# Patient Record
Sex: FEMALE | Race: ASIAN | NOT HISPANIC OR LATINO | URBAN - METROPOLITAN AREA
[De-identification: names, ages, dates, MRNs, and addresses within clinical notes are randomized per-mention and may not be internally consistent; named-entity substitution may affect disease eponyms.]

---

## 2024-08-05 ENCOUNTER — INPATIENT (INPATIENT)
Facility: HOSPITAL | Age: 39
LOS: 0 days | Discharge: HOME | End: 2024-08-06
Attending: OBSTETRICS & GYNECOLOGY | Admitting: OBSTETRICS & GYNECOLOGY
Payer: COMMERCIAL

## 2024-08-05 VITALS — HEIGHT: 64 IN | TEMPERATURE: 98 F | WEIGHT: 200.62 LBS

## 2024-08-05 LAB
BASOPHILS # BLD AUTO: 0.03 K/UL — SIGNIFICANT CHANGE UP (ref 0–0.2)
BASOPHILS NFR BLD AUTO: 0.3 % — SIGNIFICANT CHANGE UP (ref 0–2)
BLD GP AB SCN SERPL QL: NEGATIVE — SIGNIFICANT CHANGE UP
EOSINOPHIL # BLD AUTO: 0.27 K/UL — SIGNIFICANT CHANGE UP (ref 0–0.5)
EOSINOPHIL NFR BLD AUTO: 2.7 % — SIGNIFICANT CHANGE UP (ref 0–6)
HCT VFR BLD CALC: 38 % — SIGNIFICANT CHANGE UP (ref 34.5–45)
HGB BLD-MCNC: 12.3 G/DL — SIGNIFICANT CHANGE UP (ref 11.5–15.5)
IMM GRANULOCYTES NFR BLD AUTO: 1.1 % — HIGH (ref 0–0.9)
LYMPHOCYTES # BLD AUTO: 1.72 K/UL — SIGNIFICANT CHANGE UP (ref 1–3.3)
LYMPHOCYTES # BLD AUTO: 17.1 % — SIGNIFICANT CHANGE UP (ref 13–44)
MCHC RBC-ENTMCNC: 30 PG — SIGNIFICANT CHANGE UP (ref 27–34)
MCHC RBC-ENTMCNC: 32.4 GM/DL — SIGNIFICANT CHANGE UP (ref 32–36)
MCV RBC AUTO: 92.7 FL — SIGNIFICANT CHANGE UP (ref 80–100)
MONOCYTES # BLD AUTO: 0.72 K/UL — SIGNIFICANT CHANGE UP (ref 0–0.9)
MONOCYTES NFR BLD AUTO: 7.1 % — SIGNIFICANT CHANGE UP (ref 2–14)
NEUTROPHILS # BLD AUTO: 7.22 K/UL — SIGNIFICANT CHANGE UP (ref 1.8–7.4)
NEUTROPHILS NFR BLD AUTO: 71.7 % — SIGNIFICANT CHANGE UP (ref 43–77)
NRBC # BLD: 0 /100 WBCS — SIGNIFICANT CHANGE UP (ref 0–0)
PLATELET # BLD AUTO: 223 K/UL — SIGNIFICANT CHANGE UP (ref 150–400)
RBC # BLD: 4.1 M/UL — SIGNIFICANT CHANGE UP (ref 3.8–5.2)
RBC # FLD: 14.2 % — SIGNIFICANT CHANGE UP (ref 10.3–14.5)
RH IG SCN BLD-IMP: POSITIVE — SIGNIFICANT CHANGE UP
RH IG SCN BLD-IMP: POSITIVE — SIGNIFICANT CHANGE UP
WBC # BLD: 10.07 K/UL — SIGNIFICANT CHANGE UP (ref 3.8–10.5)
WBC # FLD AUTO: 10.07 K/UL — SIGNIFICANT CHANGE UP (ref 3.8–10.5)

## 2024-08-05 RX ORDER — IBUPROFEN 200 MG
600 TABLET ORAL EVERY 6 HOURS
Refills: 0 | Status: COMPLETED | OUTPATIENT
Start: 2024-08-05 | End: 2025-07-04

## 2024-08-05 RX ORDER — DIBUCAINE 1 %
1 OINTMENT (GRAM) TOPICAL EVERY 6 HOURS
Refills: 0 | Status: DISCONTINUED | OUTPATIENT
Start: 2024-08-05 | End: 2024-08-06

## 2024-08-05 RX ORDER — BACTERIOSTATIC SODIUM CHLORIDE 0.9 %
3 VIAL (ML) INJECTION EVERY 8 HOURS
Refills: 0 | Status: DISCONTINUED | OUTPATIENT
Start: 2024-08-05 | End: 2024-08-06

## 2024-08-05 RX ORDER — MAGNESIUM HYDROXIDE 400 MG/5ML
30 SUSPENSION, ORAL (FINAL DOSE FORM) ORAL
Refills: 0 | Status: DISCONTINUED | OUTPATIENT
Start: 2024-08-05 | End: 2024-08-06

## 2024-08-05 RX ORDER — CLOSTRIDIUM TETANI TOXOID ANTIGEN (FORMALDEHYDE INACTIVATED), CORYNEBACTERIUM DIPHTHERIAE TOXOID ANTIGEN (FORMALDEHYDE INACTIVATED), BORDETELLA PERTUSSIS TOXOID ANTIGEN (GLUTARALDEHYDE INACTIVATED), BORDETELLA PERTUSSIS FILAMENTOUS HEMAGGLUTININ ANTIGEN (FORMALDEHYDE INACTIVATED), BORDETELLA PERTUSSIS PERTACTIN ANTIGEN, AND BORDETELLA PERTUSSIS FIMBRIAE 2/3 ANTIGEN 5; 2; 2.5; 5; 3; 5 [LF]/.5ML; [LF]/.5ML; UG/.5ML; UG/.5ML; UG/.5ML; UG/.5ML
0.5 INJECTION, SUSPENSION INTRAMUSCULAR ONCE
Refills: 0 | Status: DISCONTINUED | OUTPATIENT
Start: 2024-08-05 | End: 2024-08-06

## 2024-08-05 RX ORDER — OXYCODONE HYDROCHLORIDE 30 MG/1
5 TABLET ORAL
Refills: 0 | Status: DISCONTINUED | OUTPATIENT
Start: 2024-08-05 | End: 2024-08-06

## 2024-08-05 RX ORDER — HYDROCORTISONE 1 %
1 CREAM (GRAM) TOPICAL EVERY 6 HOURS
Refills: 0 | Status: DISCONTINUED | OUTPATIENT
Start: 2024-08-05 | End: 2024-08-06

## 2024-08-05 RX ORDER — CRANBERRY FRUIT EXTRACT 650 MG
1 CAPSULE ORAL DAILY
Refills: 0 | Status: DISCONTINUED | OUTPATIENT
Start: 2024-08-05 | End: 2024-08-06

## 2024-08-05 RX ORDER — ACETAMINOPHEN 500 MG
975 TABLET ORAL
Refills: 0 | Status: DISCONTINUED | OUTPATIENT
Start: 2024-08-05 | End: 2024-08-06

## 2024-08-05 RX ORDER — LANOLIN 100 %
1 OINTMENT (GRAM) TOPICAL EVERY 6 HOURS
Refills: 0 | Status: DISCONTINUED | OUTPATIENT
Start: 2024-08-05 | End: 2024-08-06

## 2024-08-05 RX ORDER — SIMETHICONE 125 MG/1
80 TABLET, CHEWABLE ORAL EVERY 4 HOURS
Refills: 0 | Status: DISCONTINUED | OUTPATIENT
Start: 2024-08-05 | End: 2024-08-06

## 2024-08-05 RX ORDER — OXYTOCIN/RINGER'S LACTATE 20/1000 ML
333.33 PLASTIC BAG, INJECTION (ML) INTRAVENOUS
Qty: 20 | Refills: 0 | Status: DISCONTINUED | OUTPATIENT
Start: 2024-08-05 | End: 2024-08-05

## 2024-08-05 RX ORDER — DIPHENHYDRAMINE HCL 25 MG
25 CAPSULE ORAL EVERY 6 HOURS
Refills: 0 | Status: DISCONTINUED | OUTPATIENT
Start: 2024-08-05 | End: 2024-08-06

## 2024-08-05 RX ORDER — OXYTOCIN/RINGER'S LACTATE 20/1000 ML
41.67 PLASTIC BAG, INJECTION (ML) INTRAVENOUS
Qty: 20 | Refills: 0 | Status: DISCONTINUED | OUTPATIENT
Start: 2024-08-05 | End: 2024-08-06

## 2024-08-05 RX ORDER — KETOROLAC TROMETHAMINE 10 MG
30 TABLET ORAL ONCE
Refills: 0 | Status: DISCONTINUED | OUTPATIENT
Start: 2024-08-05 | End: 2024-08-05

## 2024-08-05 RX ORDER — DEXTROSE MONOHYDRATE, SODIUM CHLORIDE, SODIUM LACTATE, CALCIUM CHLORIDE, MAGNESIUM CHLORIDE 1.5; 538; 448; 18.4; 5.08 G/100ML; MG/100ML; MG/100ML; MG/100ML; MG/100ML
1000 SOLUTION INTRAPERITONEAL
Refills: 0 | Status: DISCONTINUED | OUTPATIENT
Start: 2024-08-05 | End: 2024-08-05

## 2024-08-05 RX ORDER — AMPICILLIN 1 G/1
1 INJECTION, POWDER, FOR SOLUTION INTRAMUSCULAR; INTRAVENOUS EVERY 4 HOURS
Refills: 0 | Status: DISCONTINUED | OUTPATIENT
Start: 2024-08-05 | End: 2024-08-05

## 2024-08-05 RX ORDER — WITCH HAZEL 500 MG/1
1 CLOTH TOPICAL EVERY 4 HOURS
Refills: 0 | Status: DISCONTINUED | OUTPATIENT
Start: 2024-08-05 | End: 2024-08-06

## 2024-08-05 RX ORDER — OXYTOCIN/RINGER'S LACTATE 20/1000 ML
6 PLASTIC BAG, INJECTION (ML) INTRAVENOUS
Qty: 30 | Refills: 0 | Status: DISCONTINUED | OUTPATIENT
Start: 2024-08-05 | End: 2024-08-05

## 2024-08-05 RX ORDER — TRISODIUM CITRATE DIHYDRATE AND CITRIC ACID MONOHYDRATE 500; 334 MG/5ML; MG/5ML
15 SOLUTION ORAL EVERY 6 HOURS
Refills: 0 | Status: DISCONTINUED | OUTPATIENT
Start: 2024-08-05 | End: 2024-08-05

## 2024-08-05 RX ORDER — IBUPROFEN 200 MG
600 TABLET ORAL EVERY 6 HOURS
Refills: 0 | Status: DISCONTINUED | OUTPATIENT
Start: 2024-08-05 | End: 2024-08-06

## 2024-08-05 RX ORDER — AMPICILLIN 1 G/1
2 INJECTION, POWDER, FOR SOLUTION INTRAMUSCULAR; INTRAVENOUS ONCE
Refills: 0 | Status: COMPLETED | OUTPATIENT
Start: 2024-08-05 | End: 2024-08-05

## 2024-08-05 RX ORDER — CHLORHEXIDINE GLUCONATE 500 MG/1
1 CLOTH TOPICAL DAILY
Refills: 0 | Status: DISCONTINUED | OUTPATIENT
Start: 2024-08-05 | End: 2024-08-05

## 2024-08-05 RX ADMIN — DEXTROSE MONOHYDRATE, SODIUM CHLORIDE, SODIUM LACTATE, CALCIUM CHLORIDE, MAGNESIUM CHLORIDE 125 MILLILITER(S): 1.5; 538; 448; 18.4; 5.08 SOLUTION INTRAPERITONEAL at 11:00

## 2024-08-05 RX ADMIN — Medication 30 MILLIGRAM(S): at 15:25

## 2024-08-05 RX ADMIN — Medication 975 MILLIGRAM(S): at 18:10

## 2024-08-05 RX ADMIN — AMPICILLIN 108 GRAM(S): 1 INJECTION, POWDER, FOR SOLUTION INTRAMUSCULAR; INTRAVENOUS at 13:17

## 2024-08-05 RX ADMIN — DEXTROSE MONOHYDRATE, SODIUM CHLORIDE, SODIUM LACTATE, CALCIUM CHLORIDE, MAGNESIUM CHLORIDE 125 MILLILITER(S): 1.5; 538; 448; 18.4; 5.08 SOLUTION INTRAPERITONEAL at 09:20

## 2024-08-05 RX ADMIN — AMPICILLIN 216 GRAM(S): 1 INJECTION, POWDER, FOR SOLUTION INTRAMUSCULAR; INTRAVENOUS at 09:26

## 2024-08-05 RX ADMIN — Medication 975 MILLIGRAM(S): at 23:20

## 2024-08-05 RX ADMIN — Medication 6 MILLIUNIT(S)/MIN: at 10:17

## 2024-08-05 RX ADMIN — Medication 600 MILLIGRAM(S): at 20:40

## 2024-08-05 RX ADMIN — Medication 975 MILLIGRAM(S): at 18:46

## 2024-08-05 RX ADMIN — Medication 1 SPRAY(S): at 18:10

## 2024-08-05 RX ADMIN — Medication 125 MILLIUNIT(S)/MIN: at 14:05

## 2024-08-05 NOTE — OB PROVIDER LABOR PROGRESS NOTE - ASSESSMENT
- Cat II FHT 2/2 nonrecurrent variable decels  - Epidural  - S/p balloon  - Pit at 6mu, cont as tolerated  - Dr. Canada in house  - Anticipate vaginal delivery

## 2024-08-05 NOTE — OB PROVIDER H&P - HISTORY OF PRESENT ILLNESS
40 y/o  at 39w4d presents for induction of labor due to maternal age and weight gain. Reports contractions beginning today, irregular. Denies leakage of fluid and vaginal bleeding. Endorses fetal movement.    ANTE: Spontaneous pregnancy. NIPT and anatomy scan WNL. GTT passed. GBS positive. Denies HTN or thyroid disorders. EFW ~3000 g last week.    OB: G1 -  2018, ~3000g, no complications.   GynHx: Denies fibroids, ovarian cysts, STI's, or abnormal PAP.  PMHx: Asthma in youth, never hospitalized or intubated. Denies inhaler use.   PSurgHx: Denies  Medications: PNV  ALL: NKDA    BP:   HR:  Gen: NAD, A&Ox3  Abd: non-tender, gravid  LE: no swelling or pitting edema  Pulm: no increased WOB  SVE:     FHT:  bpm, moderate variability, + accels, - decels.  [place]: Uterine irritability  TAUS: Cephalic.    - IV fluids, NPO  - Admit to L&D  - Continuous FHT and toco monitoring. Currently reactive and reassuring.  - Prenatals reviewed. GBS positive. Ampicillin ordered for ppx.  - Cook balloon placed 0945, start pitocin  - Risks, benefits, and alternatives discussed. Consents obtained.    D/W  ALEX WaltersS 38 y/o  at 39w4d presents for induction of labor due to maternal age and weight gain. Reports contractions beginning today, irregular. Denies leakage of fluid and vaginal bleeding. Endorses fetal movement.    ANTE: Spontaneous pregnancy. NIPT and anatomy scan WNL. GTT passed. GBS positive. Denies HTN or thyroid disorders. EFW ~3000 g last week.    OB: G1 -  2018, ~3000g, no complications.   GynHx: Denies fibroids, ovarian cysts, STI's, or abnormal PAP.  PMHx: Asthma in youth, never hospitalized or intubated. Denies inhaler use.   PSurgHx: Denies  Medications: PNV  ALL: NKDA    BP:   HR:  Gen: NAD, A&Ox3  Abd: non-tender, gravid  LE: no swelling or pitting edema  Pulm: no increased WOB  SVE:     FHT:  bpm, moderate variability, + accels, - decels.  [place]: Uterine irritability  TAUS: Cephalic.    38 y/o  at 39w4d presents for induction of labor  - IV fluids, NPO  - Admit to L&D  - Continuous FHT and toco monitoring. Currently reactive and reassuring.  - Prenatals reviewed. GBS positive. Ampicillin ordered for ppx.  - Cook balloon placed 0945, start pitocin  - Risks, benefits, and alternatives discussed. Consents obtained.    D/W  ALEX WaltersS 40 y/o  at 39w4d presents for term induction of labor. Reports contractions beginning today, irregular. Denies leakage of fluid and vaginal bleeding. Endorses fetal movement.    ANTE: Spontaneous pregnancy. NIPT and anatomy scan WNL. GTT passed. GBS positive. Denies HTN or thyroid disorders. EFW 3000 g last week.    OB: G1 -  2018, 3000g, no complications.   G2 - current  GynHx: Denies fibroids, ovarian cysts, STI's, or abnormal PAP.  PMHx: Asthma in youth, never hospitalized or intubated. Denies current inhaler use.   PSurgHx: Denies  Medications: PNV  ALL: NKDA    BP:   HR:  Gen: NAD, A&Ox3  Abd: non-tender, gravid  LE: no swelling or pitting edema  Pulm: no increased WOB  SVE:     FHT:  bpm, moderate variability, + accels, - decels.  [place]: Uterine irritability  TAUS: Cephalic.   40 y/o  at 39w4d presents for term induction of labor. Reports contractions beginning today, irregular. Denies leakage of fluid and vaginal bleeding. Endorses fetal movement.    ANTE: Spontaneous pregnancy. NIPT and anatomy scan WNL. GTT passed. GBS positive. Denies HTN or thyroid disorders. EFW 3000 g last week.    OB: G1 -  2018, 3000g, no complications.   G2 - current  GynHx: Denies fibroids, ovarian cysts, STI's, or abnormal PAP.  PMHx: Asthma in youth, never hospitalized or intubated. Denies current inhaler use.   PSurgHx: Denies  Medications: PNV  ALL: NKDA      Gen: NAD, A&Ox3  Abd: non-tender, gravid  LE: no swelling or pitting edema  Pulm: no increased WOB  SVE: deferred for baloon placement    FHT:  bpm, moderate variability, + accels, - decels.  [place]: Uterine irritability  TAUS: Cephalic.

## 2024-08-05 NOTE — OB RN DELIVERY SUMMARY - NSSELHIDDEN_OBGYN_ALL_OB_FT
[NS_DeliveryAttending1_OBGYN_ALL_OB_FT:[id number]],[NS_DeliveryAssist1_OBGYN_ALL_OB_FT:[id number]=],[NS_DeliveryRN_OBGYN_ALL_OB_FT:[id number]=]

## 2024-08-05 NOTE — PRE-ANESTHESIA EVALUATION ADULT - NSANTHPMHFT_GEN_ALL_CORE
OB: G1 -  2018, 3000g, no complications.   G2 - current  GynHx: Denies fibroids, ovarian cysts, STI's, or abnormal PAP.  PMHx: Asthma in youth, never hospitalized or intubated. Denies current inhaler use.   PSurgHx: Denies  Medications: PNV  ALL: NKDA

## 2024-08-05 NOTE — OB PROVIDER LABOR PROGRESS NOTE - NS_SUBJECTIVE/OBJECTIVE_OBGYN_ALL_OB_FT
Pt seen at bedside. Comfortable w/ epidural. SVE 5/50/-3, AROM light mec. FHT reviewed. Baseline 140, mod variability, +accels, -decels. Ctx q2-3min

## 2024-08-05 NOTE — OB PROVIDER H&P - ASSESSMENT
38 y/o  at 39w4d presents for term induction of labor  - IV fluids, NPO. Labs sent   - Admit to L&D  - Continuous FHT and toco monitoring. Currently reactive and reassuring.  - Prenatals reviewed. GBS positive. Ampicillin ordered for ppx.  - Plan for balloon and pitocin per Dr. Canada  - Risks, benefits, and alternatives discussed. Consents obtained.    D/W Miguel PGY1 [place] PGY4 [place] attending  Rayna Villalobos, PA-S

## 2024-08-05 NOTE — OB PROVIDER H&P - NSLOWPPHRISK_OBGYN_A_OB
No previous uterine incision/Olea Pregnancy/Less than or equal to 4 previous vaginal births/No known bleeding disorder/No history of postpartum hemorrhage

## 2024-08-05 NOTE — OB PROVIDER LABOR PROGRESS NOTE - ASSESSMENT
- Cat I FHT  - S/p balloon  - Epidural  - Light mec, NICU to be present at delivery  - Pit at 6mu, cont as tolerated  - Dr. Canada updated  - Cont current managemetn

## 2024-08-05 NOTE — OB RN DELIVERY SUMMARY - NS_SEPSISRSKCALC_OBGYN_ALL_OB_FT
EOS calculated successfully. EOS Risk Factor: 0.5/1000 live births ([facility] national incidence); GA=39w5d; Temp=98.3; ROM=2.583; GBS='Positive'; Antibiotics='GBS specific antibiotics > 2 hrs prior to birth'

## 2024-08-05 NOTE — OB PROVIDER DELIVERY SUMMARY - NSPROVIDERDELIVERYNOTE_OBGYN_ALL_OB_FT
38 yo  presented at 39w4d for term induction of labor on 24. Labor was induced with cook balloon at 0940 and pitocin at 1025. Patient received an epidural for analgesia at 1101. Amniotomy was performed with light mec at 1130. She progressed to fully dilated at 1350, pushed effectively, and had a  in OA position over intact perineum with APGARS 9/9 and birth weight 3570. Placenta delivered spontaneously intact with 3 vessel cord. Cervix was inspected and found intact. A second degree laceration was repaired with 2-0 vicryl suture without complications. Excellent hemostasis.  . Patient tolerated procedure well. Mother and infant in stable conditions. Dr. Salvador and Dr. Canada present throughout procedure. 38 yo  presented at 39w5d for term induction of labor on 24. Labor was induced with cook balloon at 0940 and pitocin at 1025. Patient received an epidural for analgesia at 1101. Amniotomy was performed with light mec at 1130. She progressed to fully dilated at 1350, pushed effectively, and had a  in OA position over intact perineum of a viable female infant with APGARS 9/9 and birth weight 3570 at 1401. Placenta delivered spontaneously intact with 3 vessel cord at 1404. Cervix was inspected and found intact. A second degree laceration was repaired with 2-0 vicryl suture without complications. Excellent hemostasis.  . Patient tolerated procedure well. Mother and infant in stable conditions. Dr. Salvador and Dr. Canada present throughout procedure.

## 2024-08-06 VITALS
OXYGEN SATURATION: 96 % | TEMPERATURE: 98 F | RESPIRATION RATE: 18 BRPM | SYSTOLIC BLOOD PRESSURE: 108 MMHG | HEART RATE: 80 BPM | DIASTOLIC BLOOD PRESSURE: 66 MMHG

## 2024-08-06 LAB — T PALLIDUM AB TITR SER: NEGATIVE — SIGNIFICANT CHANGE UP

## 2024-08-06 PROCEDURE — 36415 COLL VENOUS BLD VENIPUNCTURE: CPT

## 2024-08-06 PROCEDURE — 85025 COMPLETE CBC W/AUTO DIFF WBC: CPT

## 2024-08-06 PROCEDURE — 86901 BLOOD TYPING SEROLOGIC RH(D): CPT

## 2024-08-06 PROCEDURE — 59050 FETAL MONITOR W/REPORT: CPT

## 2024-08-06 PROCEDURE — 86850 RBC ANTIBODY SCREEN: CPT

## 2024-08-06 PROCEDURE — 86780 TREPONEMA PALLIDUM: CPT

## 2024-08-06 PROCEDURE — 86900 BLOOD TYPING SEROLOGIC ABO: CPT

## 2024-08-06 RX ORDER — ACETAMINOPHEN 500 MG
3 TABLET ORAL
Qty: 0 | Refills: 0 | DISCHARGE
Start: 2024-08-06

## 2024-08-06 RX ORDER — IBUPROFEN 200 MG
1 TABLET ORAL
Qty: 0 | Refills: 0 | DISCHARGE
Start: 2024-08-06

## 2024-08-06 RX ADMIN — Medication 600 MILLIGRAM(S): at 15:13

## 2024-08-06 RX ADMIN — Medication 3 MILLILITER(S): at 00:00

## 2024-08-06 RX ADMIN — Medication 600 MILLIGRAM(S): at 09:34

## 2024-08-06 RX ADMIN — Medication 975 MILLIGRAM(S): at 05:57

## 2024-08-06 RX ADMIN — Medication 600 MILLIGRAM(S): at 03:55

## 2024-08-06 RX ADMIN — Medication 975 MILLIGRAM(S): at 12:58

## 2024-08-06 NOTE — PROGRESS NOTE ADULT - SUBJECTIVE AND OBJECTIVE BOX
Patient evaluated at bedside this morning, resting comfortable in bed, no acute events overnight.  She reports pain is well controlled with tylenol and motrin.  She denies headache, dizziness, chest pain, palpitations, shortness of breath, nausea, vomiting, heavy vaginal bleeding or perineal discomfort. Reports decrease in amount of vaginal bleeding and denies clots.  She has been ambulating without assistance, voiding spontaneously.  Tolerating food well, without nausea/vomit.      Physical Exam:  T(C): 36.6 (08-06-24 @ 02:00), Max: 36.7 (08-05-24 @ 22:00)  HR: 77 (08-06-24 @ 02:00) (74 - 77)  BP: 96/64 (08-06-24 @ 02:00) (96/64 - 111/67)  RR: 17 (08-06-24 @ 02:00) (17 - 18)  SpO2: 96% (08-06-24 @ 02:00) (96% - 96%)    GA: NAD, comfortable, conversational  Abd: soft, nontender, nondistended, no rebound or guarding, uterus firm.  Extremities: no swelling or calf tenderness  Perineum: lochia wnl                          12.3   10.07 )-----------( 223      ( 05 Aug 2024 09:12 )             38.0           acetaminophen     Tablet .. 975 milliGRAM(s) Oral <User Schedule>  benzocaine 20%/menthol 0.5% Spray 1 Spray(s) Topical every 6 hours PRN  dibucaine 1% Ointment 1 Application(s) Topical every 6 hours PRN  diphenhydrAMINE 25 milliGRAM(s) Oral every 6 hours PRN  diphtheria/tetanus/pertussis (acellular) Vaccine (Adacel) 0.5 milliLiter(s) IntraMuscular once  hydrocortisone 1% Cream 1 Application(s) Topical every 6 hours PRN  ibuprofen  Tablet. 600 milliGRAM(s) Oral every 6 hours  lanolin Ointment 1 Application(s) Topical every 6 hours PRN  magnesium hydroxide Suspension 30 milliLiter(s) Oral two times a day PRN  oxyCODONE    IR 5 milliGRAM(s) Oral every 3 hours PRN  oxytocin Infusion 41.667 milliUNIT(s)/Min IV Continuous <Continuous>  pramoxine 1%/zinc 5% Cream 1 Application(s) Topical every 4 hours PRN  prenatal multivitamin 1 Tablet(s) Oral daily  simethicone 80 milliGRAM(s) Chew every 4 hours PRN  sodium chloride 0.9% lock flush 3 milliLiter(s) IV Push every 8 hours  witch hazel Pads 1 Application(s) Topical every 4 hours PRN

## 2024-08-06 NOTE — DISCHARGE NOTE OB - PATIENT PORTAL LINK FT
You can access the FollowMyHealth Patient Portal offered by [facility] by registering at the following website: [email or web address]. By joining [facility]’s FollowMyHealth portal, you will also be able to view your health information using other applications (apps) compatible with our system.

## 2024-08-06 NOTE — PROGRESS NOTE ADULT - ASSESSMENT
A/P 39y s/p , PPD#1, stable, meeting postpartum milestones   - Pain: well controlled on tylenol/motrin  - GI: Tolerating regular diet  - : urinating without difficulty/pain  - DVT prophylaxis: ambulating frequently  - Dispo: PPD 2, unless otherwise specified

## 2024-08-06 NOTE — DISCHARGE NOTE OB - CARE PROVIDER_API CALL
Millie Salvador.  Obstetrics and Gynecology  [address] [place] [id number]  Phone: [phone]  Fax: [phone]  Follow Up Time: 2 months

## 2024-08-06 NOTE — DISCHARGE NOTE OB - NS MD DC FALL RISK RISK
For information on Fall & Injury Prevention, visit: [email or web address] OR  [email or web address] OR  https://www.cdc.gov/steadi/patient.html

## 2024-08-20 DIAGNOSIS — Z28.09 IMMUNIZATION NOT CARRIED OUT BECAUSE OF OTHER CONTRAINDICATION: ICD-10-CM

## 2024-08-20 DIAGNOSIS — Z3A.39 39 WEEKS GESTATION OF PREGNANCY: ICD-10-CM

## 2024-08-20 DIAGNOSIS — Z28.21 IMMUNIZATION NOT CARRIED OUT BECAUSE OF PATIENT REFUSAL: ICD-10-CM
